# Patient Record
Sex: FEMALE
[De-identification: names, ages, dates, MRNs, and addresses within clinical notes are randomized per-mention and may not be internally consistent; named-entity substitution may affect disease eponyms.]

---

## 2018-08-14 ENCOUNTER — HOSPITAL ENCOUNTER (EMERGENCY)
Dept: HOSPITAL 14 - H.ER | Age: 83
LOS: 1 days | Discharge: HOME | End: 2018-08-15
Payer: MEDICARE

## 2018-08-14 VITALS — TEMPERATURE: 98.4 F | RESPIRATION RATE: 16 BRPM

## 2018-08-14 DIAGNOSIS — N39.0: Primary | ICD-10-CM

## 2018-08-14 DIAGNOSIS — I10: ICD-10-CM

## 2018-08-14 DIAGNOSIS — E03.9: ICD-10-CM

## 2018-08-14 PROCEDURE — 87086 URINE CULTURE/COLONY COUNT: CPT

## 2018-08-14 PROCEDURE — 81003 URINALYSIS AUTO W/O SCOPE: CPT

## 2018-08-14 PROCEDURE — 99283 EMERGENCY DEPT VISIT LOW MDM: CPT

## 2018-08-14 PROCEDURE — 96372 THER/PROPH/DIAG INJ SC/IM: CPT

## 2018-08-15 VITALS — DIASTOLIC BLOOD PRESSURE: 72 MMHG | SYSTOLIC BLOOD PRESSURE: 146 MMHG | HEART RATE: 87 BPM

## 2018-08-15 VITALS — OXYGEN SATURATION: 97 %

## 2018-08-15 LAB
BACTERIA #/AREA URNS HPF: (no result) /[HPF]
BILIRUB UR-MCNC: NEGATIVE MG/DL
COLOR UR: (no result)
GLUCOSE UR STRIP-MCNC: (no result) MG/DL
LEUKOCYTE ESTERASE UR-ACNC: (no result) LEU/UL
PH UR STRIP: 6 [PH] (ref 5–8)
PROT UR STRIP-MCNC: 30 MG/DL
RBC # UR STRIP: (no result) /UL
SP GR UR STRIP: 1 (ref 1–1.03)
SQUAMOUS EPITHIAL: 1 /HPF (ref 0–5)
URINE CLARITY: (no result)
UROBILINOGEN UR-MCNC: (no result) MG/DL (ref 0.2–1)
WBC CLUMPS # UR AUTO: (no result) /HPF

## 2018-08-15 NOTE — ED PDOC
HPI: Female  Pain


Time Seen by Provider: 18 23:45


Chief Complaint (Nursing): Female Genitourinary


History Per: Patient, Family


History/Exam Limitations: no limitations


Onset/Duration Of Symptoms: Days


Current Symptoms Are (Timing): Still Present


Additional Complaint(s): 





Hx of HTN, Hypothyroidism presenting with hematuria, hesitancy, frequency x 3 

days.  States that she has had difficulty emptying her bladder for 2 months.  

Denies fevers or back pain.  No nausea, vomiting, diarrhea.  





Past Medical History


Reviewed: Historical Data, Nursing Documentation, Vital Signs


Vital Signs: 





 Last Vital Signs











Temp  98.4 F   18 23:29


 


Pulse  97 H  18 23:29


 


Resp  16   18 23:29


 


BP  177/81 H  18 23:29


 


Pulse Ox  97   18 23:29














- Medical History


PMH: HTN, Hypothyroidism





- Family History


Family History: States: Unknown Family Hx





- Home Medications


Home Medications: 


 Ambulatory Orders











 Medication  Instructions  Recorded


 


Nitrofurantoin Macrocrystals 100 mg PO BID 5 Days  cap 08/15/18





[Macrobid]  














- Allergies


Allergies/Adverse Reactions: 


 Allergies











Allergy/AdvReac Type Severity Reaction Status Date / Time


 


No Known Allergies Allergy   Verified 18 23:29














Review of Systems


ROS Statement: Except As Marked, All Systems Reviewed And Found Negative


Genitourinary Female: Positive for: Dysuria, Frequency, Hematuria





Physical Exam





- Reviewed


Nursing Documentation Reviewed: Yes


Vital Signs Reviewed: Yes





- Physical Exam


Appears: Positive for: Well, Non-toxic, No Acute Distress


Head Exam: Positive for: ATRAUMATIC, NORMAL INSPECTION, NORMOCEPHALIC


Skin: Positive for: Normal Color, Warm, DRY


Eye Exam: Positive for: EOMI, Normal appearance, PERRL


ENT: Positive for: Normal ENT Inspection


Neck: Positive for: Normal, Painless ROM


Cardiovascular/Chest: Positive for: Regular Rate, Rhythm


Respiratory: Positive for: CNT, Normal Breath Sounds


Gastrointestinal/Abdominal: Positive for: Normal Exam, Soft, Tenderness (

Suprapubic tenderness)


Back: Positive for: Normal Inspection


Extremity: Positive for: Normal ROM


Neurologic/Psych: Positive for: Alert, Oriented





- ECG


O2 Sat by Pulse Oximetry: 97


Pulse Ox Interpretation: Normal





Medical Decision Making


Medical Decision Makin


A/P: Hx of HTN and Hypothyroidism presenting with urinary symptoms


-patient likely has UTI


-will check urine, give NSAID, assists in bladder drainage with straight cath





0200


-Patient feeling much improved


-Advised to start ABx


-Advised to get repeat UA in [place] next week


-Return to ER precautions given


-Patient well appearing, normal vitals upon discharge





Disposition





- Clinical Impression


Clinical Impression: 


 Urinary tract infection








- Disposition


Referrals: 


[facility] [Outside]


Disposition Time: 02:00


Condition: IMPROVED


Prescriptions: 


Nitrofurantoin Macrocrystals [Macrobid] 100 mg PO BID 5 Days  cap


Instructions:  Urinary Tract Infection, Adult (DC)


Forms:  [employer] (English)


Print Language: Turkmen